# Patient Record
Sex: MALE | Race: WHITE | NOT HISPANIC OR LATINO | Employment: FULL TIME | URBAN - METROPOLITAN AREA
[De-identification: names, ages, dates, MRNs, and addresses within clinical notes are randomized per-mention and may not be internally consistent; named-entity substitution may affect disease eponyms.]

---

## 2017-11-22 RX ORDER — DULOXETIN HYDROCHLORIDE 60 MG/1
60 CAPSULE, DELAYED RELEASE ORAL DAILY
COMMUNITY
End: 2017-11-27

## 2017-11-22 RX ORDER — SUMATRIPTAN AND NAPROXEN SODIUM 85; 500 MG/1; MG/1
1 TABLET, FILM COATED ORAL
COMMUNITY
End: 2017-11-27

## 2017-11-27 ENCOUNTER — OFFICE VISIT (OUTPATIENT)
Dept: NEUROSURGERY | Facility: CLINIC | Age: 47
End: 2017-11-27

## 2017-11-27 VITALS
TEMPERATURE: 98.1 F | WEIGHT: 263 LBS | BODY MASS INDEX: 32.7 KG/M2 | SYSTOLIC BLOOD PRESSURE: 130 MMHG | DIASTOLIC BLOOD PRESSURE: 74 MMHG | HEIGHT: 75 IN

## 2017-11-27 DIAGNOSIS — M48.062 SPINAL STENOSIS OF LUMBAR REGION WITH NEUROGENIC CLAUDICATION: Primary | ICD-10-CM

## 2017-11-27 PROCEDURE — 99203 OFFICE O/P NEW LOW 30 MIN: CPT | Performed by: NEUROLOGICAL SURGERY

## 2017-11-27 RX ORDER — ALBUTEROL SULFATE 90 UG/1
2 AEROSOL, METERED RESPIRATORY (INHALATION) EVERY 4 HOURS PRN
COMMUNITY

## 2017-11-27 RX ORDER — MELOXICAM 7.5 MG/1
7.5 TABLET ORAL DAILY
Qty: 14 TABLET | Refills: 2 | Status: SHIPPED | OUTPATIENT
Start: 2017-11-27

## 2017-11-27 NOTE — PATIENT INSTRUCTIONS
call Dr. Durant on a Monday or Tuesday with an update.   Ask for Cat,  and leave a message for  Dr. Durant.  He will call you back at the end of the day as soon as he can.   Call 2 weeks    [phone]

## 2021-02-25 ENCOUNTER — LAB (OUTPATIENT)
Dept: LAB | Facility: HOSPITAL | Age: 51
End: 2021-02-25

## 2021-02-25 ENCOUNTER — CONSULT (OUTPATIENT)
Dept: ONCOLOGY | Facility: CLINIC | Age: 51
End: 2021-02-25

## 2021-02-25 VITALS
BODY MASS INDEX: 29.59 KG/M2 | WEIGHT: 238 LBS | OXYGEN SATURATION: 99 % | DIASTOLIC BLOOD PRESSURE: 78 MMHG | RESPIRATION RATE: 18 BRPM | HEIGHT: 75 IN | TEMPERATURE: 96.4 F | HEART RATE: 74 BPM | SYSTOLIC BLOOD PRESSURE: 134 MMHG

## 2021-02-25 DIAGNOSIS — D72.89 LEFT-SHIFTED WHITE BLOOD CELLS: ICD-10-CM

## 2021-02-25 DIAGNOSIS — D72.89 LEFT-SHIFTED WHITE BLOOD CELLS: Primary | ICD-10-CM

## 2021-02-25 LAB
BASOPHILS # BLD AUTO: 0.08 10*3/MM3 (ref 0–0.2)
BASOPHILS NFR BLD AUTO: 1 % (ref 0–1.5)
DEPRECATED RDW RBC AUTO: 43.7 FL (ref 37–54)
EOSINOPHIL # BLD AUTO: 0.66 10*3/MM3 (ref 0–0.4)
EOSINOPHIL NFR BLD AUTO: 8.2 % (ref 0.3–6.2)
ERYTHROCYTE [DISTWIDTH] IN BLOOD BY AUTOMATED COUNT: 13.3 % (ref 12.3–15.4)
HCT VFR BLD AUTO: 42.8 % (ref 37.5–51)
HGB BLD-MCNC: 14.6 G/DL (ref 13–17.7)
IMM GRANULOCYTES # BLD AUTO: 0.03 10*3/MM3 (ref 0–0.05)
IMM GRANULOCYTES NFR BLD AUTO: 0.4 % (ref 0–0.5)
LYMPHOCYTES # BLD AUTO: 2.67 10*3/MM3 (ref 0.7–3.1)
LYMPHOCYTES NFR BLD AUTO: 33.3 % (ref 19.6–45.3)
MCH RBC QN AUTO: 30.4 PG (ref 26.6–33)
MCHC RBC AUTO-ENTMCNC: 34.1 G/DL (ref 31.5–35.7)
MCV RBC AUTO: 89.2 FL (ref 79–97)
MONOCYTES # BLD AUTO: 0.72 10*3/MM3 (ref 0.1–0.9)
MONOCYTES NFR BLD AUTO: 9 % (ref 5–12)
NEUTROPHILS NFR BLD AUTO: 3.86 10*3/MM3 (ref 1.7–7)
NEUTROPHILS NFR BLD AUTO: 48.1 % (ref 42.7–76)
NRBC BLD AUTO-RTO: 0 /100 WBC (ref 0–0.2)
PLATELET # BLD AUTO: 307 10*3/MM3 (ref 140–450)
PMV BLD AUTO: 10.3 FL (ref 6–12)
RBC # BLD AUTO: 4.8 10*6/MM3 (ref 4.14–5.8)
WBC # BLD AUTO: 8.02 10*3/MM3 (ref 3.4–10.8)

## 2021-02-25 PROCEDURE — 36415 COLL VENOUS BLD VENIPUNCTURE: CPT

## 2021-02-25 PROCEDURE — 85060 BLOOD SMEAR INTERPRETATION: CPT

## 2021-02-25 PROCEDURE — 85025 COMPLETE CBC W/AUTO DIFF WBC: CPT

## 2021-02-25 PROCEDURE — 99204 OFFICE O/P NEW MOD 45 MIN: CPT | Performed by: INTERNAL MEDICINE

## 2021-02-25 NOTE — PROGRESS NOTES
CHIEF COMPLAINT: Left shift    REASON FOR REFERRAL: Left shift      RECORDS OBTAINED  Records of the patients history including those obtained from Dr. Persaud were reviewed and summarized in detail.    Oncology/Hematology History Overview Note   1.  Slight increase immature granulocytes and eosinophil count  2.  Tracheobronchitis  3.  Hyperlipidemia  4.  Reflux  5.  Sleep apnea  6.  Migraines  7.  Asthma  8.  History of lumbar discectomy      Hematology history:  -ER visit with general malaise and cough.  CBC showing a few mature granulocytes in the peripheral blood.  Coronary artery calcification seen on CT evaluation of his cough.  Due to see pulmonary medicine.  CBC in the ER per Dr. Persaud note mentions eosinophilia and immature granulocytes.  Temps to 100.  -12/23/2020 CBC normal including differential and eosinophil count with unremarkable CMP and normal sedimentation rate  -1/19/2021 IgE normal 35.  White count normal 10,700 with normal hemoglobin and platelet count with absolute eosinophil count virtually normal at 500 and minimal absolute immature granulocytosis of 400/mcL.    -2/25/2021 [facility] consult: In December the patient had an high fevers to 103 with cough that did not elizabeth.  This lasted through the end of December.  By January ultimately ended up on steroids which did help the cough and the fevers but a small nodule in the lung was found 1.6 cm for which repeat follow-up with  is scheduled and they are trying to decide between PET versus CT.  I told him to get 1 or the other but not to wait months to decide.  Ultimately was sent to me because of a minuscule elevation of his granulocyte and eosinophil counts which I think are likely irrelevant and transient given previously normal blood counts and likely reactive to his prior inflammation, steroids, etc.  Before doing anything further I would simply repeat his blood count and peripheral smear and I will see him back in a week  "to go over that and, assuming it is normal, I would do nothing further as his white count is normal but the absolute granulocyte count was just barely abnormal and likely inconsequential.     Left-shifted white blood cells       HISTORY OF PRESENT ILLNESS:  The patient is a 50 y.o.  male, referred for left shift.  Now feels fine.  See above for oncology history of present illness and plan    REVIEW OF SYSTEMS:  No fevers chills or other constitutional complaints presently.    Past Medical History:   Diagnosis Date   • Asthma    • Migraine      Past Surgical History:   Procedure Laterality Date   • LUMBAR DISCECTOMY  2009    Dr. Jaziel Reddy       Current Outpatient Medications on File Prior to Visit   Medication Sig Dispense Refill   • albuterol (PROVENTIL HFA;VENTOLIN HFA) 108 (90 Base) MCG/ACT inhaler Inhale 2 puffs Every 4 (Four) Hours As Needed for Wheezing.     • meloxicam (MOBIC) 7.5 MG tablet Take 1 tablet by mouth Daily. 14 tablet 2     No current facility-administered medications on file prior to visit.        No Known Allergies    Social History     Socioeconomic History   • Marital status:      Spouse name: Not on file   • Number of children: Not on file   • Years of education: Not on file   • Highest education level: Not on file   Tobacco Use   • Smoking status: Never Smoker   • Smokeless tobacco: Never Used   Substance and Sexual Activity   • Alcohol use: Yes   • Drug use: No   • Sexual activity: Defer       Family History   Problem Relation Age of Onset   • Cancer Father        PHYSICAL EXAM:  No jaundice or icterus.  No palpable adenopathy.  Overall appears healthy.  No wheezing.    /78   Pulse 74   Temp 96.4 °F (35.8 °C)   Resp 18   Ht 190.5 cm (75\")   Wt 108 kg (238 lb)   SpO2 99%   BMI 29.75 kg/m²     ECOG score: 0           ECOG: (0) Fully Active - Able to Carry On All Pre-disease Performance Without Restriction    No results found for: HGB, HCT, MCV, PLT, WBC, NEUTROABS, " LYMPHSABS, MONOSABS, EOSABS, BASOSABS  No results found for: GLUCOSE, BUN, CREATININE, NA, K, CL, CO2, CALCIUM, PROTEINTOT, ALBUMIN, BILITOT, ALKPHOS, AST, ALT      Assessment/Plan   1.  Slight increase immature granulocytes and eosinophil count  2.  Tracheobronchitis  3.  Hyperlipidemia  4.  Reflux  5.  Sleep apnea  6.  Migraines  7.  Asthma  8.  History of lumbar discectomy      Hematology history:  -ER visit with general malaise and cough.  CBC showing a few mature granulocytes in the peripheral blood.  Coronary artery calcification seen on CT evaluation of his cough.  Due to see pulmonary medicine.  CBC in the ER per Dr. Persaud note mentions eosinophilia and immature granulocytes.  Temps to 100.  -12/23/2020 CBC normal including differential and eosinophil count with unremarkable CMP and normal sedimentation rate  -1/19/2021 IgE normal 35.  White count normal 10,700 with normal hemoglobin and platelet count with absolute eosinophil count virtually normal at 500 and minimal absolute immature granulocytosis of 400/mcL.    -2/25/2021 [facility] consult: In December the patient had an high fevers to 103 with cough that did not elizabeth.  This lasted through the end of December.  By January ultimately ended up on steroids which did help the cough and the fevers but a small nodule in the lung was found 1.6 cm for which repeat follow-up with  is scheduled and they are trying to decide between PET versus CT.  I told him to get 1 or the other but not to wait months to decide.  Ultimately was sent to me because of a minuscule elevation of his granulocyte and eosinophil counts which I think are likely irrelevant and transient given previously normal blood counts and likely reactive to his prior inflammation, steroids, etc.  Before doing anything further I would simply repeat his blood count and peripheral smear and I will see him back in a week to go over that and, assuming it is normal, I would do nothing  further as his white count is normal but the absolute granulocyte count was just barely abnormal and likely inconsequential.    Total time of care today including reviewing his records from above and going over the differential diagnosis and plan as outlined above was 45 minutes.      Rayray Flor MD    2/25/2021

## 2021-02-27 LAB
CYTOLOGIST CVX/VAG CYTO: NORMAL
PATH INTERP BLD-IMP: NORMAL

## 2021-03-04 ENCOUNTER — OFFICE VISIT (OUTPATIENT)
Dept: ONCOLOGY | Facility: CLINIC | Age: 51
End: 2021-03-04

## 2021-03-04 VITALS
WEIGHT: 238.2 LBS | BODY MASS INDEX: 29.62 KG/M2 | DIASTOLIC BLOOD PRESSURE: 76 MMHG | HEART RATE: 75 BPM | TEMPERATURE: 96.2 F | RESPIRATION RATE: 16 BRPM | SYSTOLIC BLOOD PRESSURE: 137 MMHG | OXYGEN SATURATION: 99 % | HEIGHT: 75 IN

## 2021-03-04 DIAGNOSIS — D72.89 LEFT-SHIFTED WHITE BLOOD CELLS: Primary | ICD-10-CM

## 2021-03-04 PROCEDURE — 99213 OFFICE O/P EST LOW 20 MIN: CPT | Performed by: INTERNAL MEDICINE

## 2021-03-04 NOTE — PROGRESS NOTES
CHIEF COMPLAINT: Follow-up abnormal differential    Problem List:  Oncology/Hematology History Overview Note   1.  Slight increase immature granulocytes and eosinophil count  2.  Tracheobronchitis  3.  Hyperlipidemia  4.  Reflux  5.  Sleep apnea  6.  Migraines  7.  Asthma  8.  History of lumbar discectomy      Hematology history:  -ER visit with general malaise and cough.  CBC showing a few mature granulocytes in the peripheral blood.  Coronary artery calcification seen on CT evaluation of his cough.  Due to see pulmonary medicine.  CBC in the ER per Dr. Persaud note mentions eosinophilia and immature granulocytes.  Temps to 100.  -12/23/2020 CBC normal including differential and eosinophil count with unremarkable CMP and normal sedimentation rate  -1/19/2021 IgE normal 35.  White count normal 10,700 with normal hemoglobin and platelet count with absolute eosinophil count virtually normal at 500 and minimal absolute immature granulocytosis of 400/mcL.    -2/25/2021 [facility] consult: In December the patient had an high fevers to 103 with cough that did not elizabeth.  This lasted through the end of December.  By January ultimately ended up on steroids which did help the cough and the fevers but a small nodule in the lung was found 1.6 cm for which repeat follow-up with  is scheduled and they are trying to decide between PET versus CT.  I told him to get 1 or the other but not to wait months to decide.  Ultimately was sent to me because of a minuscule elevation of his granulocyte and eosinophil counts which I think are likely irrelevant and transient given previously normal blood counts and likely reactive to his prior inflammation, steroids, etc.  Before doing anything further I would simply repeat his blood count and peripheral smear and I will see him back in a week to go over that and, assuming it is normal, I would do nothing further as his white count is normal but the absolute granulocyte count  "was just barely abnormal and likely inconsequential.    -3/4/2021 [facility] follow-up visit: CBC unremarkable save for slight elevation of eosinophil percentage at 8.2% with absolute eosinophil count of 660 which is fairly unremarkable.  Normal IgE level as previously seen.  Peripheral smear only showed a mild absolute eosinophilia otherwise unremarkable.  No immature granulocytes.  Has asthma and other reasons for mild eosinophilia.  No further hematologic work-up.  Follow-up with primary care.     Left-shifted white blood cells       HISTORY OF PRESENT ILLNESS:  The patient is a 50 y.o. male, here for follow up on management of abnormal differential with asthma.    Past Medical History:   Diagnosis Date   • Asthma    • Migraine      Past Surgical History:   Procedure Laterality Date   • LUMBAR DISCECTOMY  2009    Dr. Jaziel Reddy       No Known Allergies    Family History and Social History reviewed and changed as necessary    REVIEW OF SYSTEM:   Asthma currently under fair control    PHYSICAL EXAM:  No wheezing.  No dyspnea.  No rashes    Vitals:    03/04/21 1035   BP: 137/76   Pulse: 75   Resp: 16   Temp: 96.2 °F (35.7 °C)   TempSrc: Temporal   SpO2: 99%   Weight: 108 kg (238 lb 3.2 oz)   Height: 190.5 cm (75\")     Vitals:    03/04/21 1035   PainSc: 0-No pain          ECOG score: 0           Vitals reviewed.    ECOG: (0) Fully Active - Able to Carry On All Pre-disease Performance Without Restriction    Lab Results   Component Value Date    HGB 14.6 02/25/2021    HCT 42.8 02/25/2021    MCV 89.2 02/25/2021     02/25/2021    WBC 8.02 02/25/2021    NEUTROABS 3.86 02/25/2021    LYMPHSABS 2.67 02/25/2021    MONOSABS 0.72 02/25/2021    EOSABS 0.66 (H) 02/25/2021    BASOSABS 0.08 02/25/2021       No results found for: GLUCOSE, BUN, CREATININE, NA, K, CL, CO2, CALCIUM, PROTEINTOT, ALBUMIN, BILITOT, ALKPHOS, AST, ALT          ASSESSMENT & PLAN:   1.  Slight increase immature granulocytes and eosinophil " count  2.  Tracheobronchitis  3.  Hyperlipidemia  4.  Reflux  5.  Sleep apnea  6.  Migraines  7.  Asthma  8.  History of lumbar discectomy      Hematology history:  -ER visit with general malaise and cough.  CBC showing a few mature granulocytes in the peripheral blood.  Coronary artery calcification seen on CT evaluation of his cough.  Due to see pulmonary medicine.  CBC in the ER per Dr. Persaud note mentions eosinophilia and immature granulocytes.  Temps to 100.  -12/23/2020 CBC normal including differential and eosinophil count with unremarkable CMP and normal sedimentation rate  -1/19/2021 IgE normal 35.  White count normal 10,700 with normal hemoglobin and platelet count with absolute eosinophil count virtually normal at 500 and minimal absolute immature granulocytosis of 400/mcL.    -2/25/2021 [facility] consult: In December the patient had an high fevers to 103 with cough that did not elizabeth.  This lasted through the end of December.  By January ultimately ended up on steroids which did help the cough and the fevers but a small nodule in the lung was found 1.6 cm for which repeat follow-up with  is scheduled and they are trying to decide between PET versus CT.  I told him to get 1 or the other but not to wait months to decide.  Ultimately was sent to me because of a minuscule elevation of his granulocyte and eosinophil counts which I think are likely irrelevant and transient given previously normal blood counts and likely reactive to his prior inflammation, steroids, etc.  Before doing anything further I would simply repeat his blood count and peripheral smear and I will see him back in a week to go over that and, assuming it is normal, I would do nothing further as his white count is normal but the absolute granulocyte count was just barely abnormal and likely inconsequential.    -3/4/2021 [facility] follow-up visit: CBC unremarkable save for slight elevation of eosinophil percentage  at 8.2% with absolute eosinophil count of 660 which is fairly unremarkable.  Normal IgE level as previously seen.  Peripheral smear only showed a mild absolute eosinophilia otherwise unremarkable.  No immature granulocytes.  Has asthma and other reasons for mild eosinophilia.  No further hematologic work-up.  Follow-up with primary care.  Total time of care today 15 minutes  Rayray Flor MD    03/04/2021